# Patient Record
Sex: MALE | Race: WHITE | NOT HISPANIC OR LATINO
[De-identification: names, ages, dates, MRNs, and addresses within clinical notes are randomized per-mention and may not be internally consistent; named-entity substitution may affect disease eponyms.]

---

## 2019-03-25 ENCOUNTER — APPOINTMENT (OUTPATIENT)
Dept: OTOLARYNGOLOGY | Facility: CLINIC | Age: 74
End: 2019-03-25
Payer: MEDICARE

## 2019-03-25 VITALS
HEIGHT: 71 IN | HEART RATE: 97 BPM | DIASTOLIC BLOOD PRESSURE: 83 MMHG | WEIGHT: 150 LBS | BODY MASS INDEX: 21 KG/M2 | SYSTOLIC BLOOD PRESSURE: 139 MMHG

## 2019-03-25 PROCEDURE — 99204 OFFICE O/P NEW MOD 45 MIN: CPT | Mod: 25

## 2019-03-25 PROCEDURE — 31231 NASAL ENDOSCOPY DX: CPT

## 2019-03-25 RX ORDER — ZOLPIDEM TARTRATE 12.5 MG/1
12.5 TABLET, EXTENDED RELEASE ORAL
Refills: 0 | Status: ACTIVE | COMMUNITY

## 2019-03-25 RX ORDER — FLUTICASONE FUROATE 27.5 UG/1
27.5 SPRAY, METERED NASAL
Refills: 0 | Status: ACTIVE | COMMUNITY

## 2019-03-25 RX ORDER — AMOXICILLIN AND CLAVULANATE POTASSIUM 875; 125 MG/1; MG/1
875-125 TABLET, COATED ORAL
Qty: 20 | Refills: 0 | Status: ACTIVE | COMMUNITY
Start: 2019-03-25 | End: 1900-01-01

## 2019-03-25 RX ORDER — DIPHENHYDRAMINE HCL 25 MG/1
CAPSULE, LIQUID FILLED ORAL
Refills: 0 | Status: ACTIVE | COMMUNITY

## 2019-03-25 RX ORDER — TAMSULOSIN HYDROCHLORIDE 0.4 MG/1
0.4 CAPSULE ORAL
Refills: 0 | Status: ACTIVE | COMMUNITY

## 2019-03-25 NOTE — HISTORY OF PRESENT ILLNESS
[Sinus] : sinus surgery [No] : patient does not have a  history of radiation therapy [de-identified] : 72 y/o male w/ hx of sinus surgery (9-10 years ago), chronic PND and sinusitis who came in complaining of intermittent nasal congestion, cough and constant PND. He states the PND and coughing is worse at night. He has intermittent clear nasal discharge. He had allergy testing which came back negative. He was tested 5 years ago. He uses Claritin D and Nasacort on a daily basis. He state these medications has help subside some of his symptoms. Pt has no ear pain, ear drainage, hearing loss, tinnitus, vertigo, epistaxis,, facial pain, facial pressure, throat pain, dysphagia or fevers.\par

## 2019-03-25 NOTE — REASON FOR VISIT
[Initial Evaluation] : an initial evaluation for [Nasal Obstruction] : nasal obstruction [FreeTextEntry2] : PND and congestion

## 2019-03-25 NOTE — PHYSICAL EXAM
[Nasal Endoscopy Performed] : nasal endoscopy was performed, see procedure section for findings [] : septum deviated to the right [Midline] : trachea located in midline position [Normal] : no rashes [de-identified] : congested b/l

## 2019-03-25 NOTE — REVIEW OF SYSTEMS
[Patient Intake Form Reviewed] : Patient intake form was reviewed [Nasal Congestion] : nasal congestion [As Noted in HPI] : as noted in HPI [Negative] : Heme/Lymph [de-identified] : nasal discharge  [de-identified] : post nasal drip

## 2019-03-25 NOTE — ASSESSMENT
[FreeTextEntry1] : Patient with history of sinus problems had sinus surgery 8 years ago. Complaining of persistent postnasal drip and is dated back 10 years. He has been on Claritin-D every day. An examination he is a deviated septum in his right nasal cavity left nasal cavity shows evidence of prior surgery with some purulence which was debrided. I put him on Augmentin and told them to try to stop that D. says that over it in his best interest to stay in a day unless absolutely necessary. A followup and see us in one month at which time consideration for a CAT scan will be given.

## 2019-05-13 ENCOUNTER — APPOINTMENT (OUTPATIENT)
Dept: OTOLARYNGOLOGY | Facility: CLINIC | Age: 74
End: 2019-05-13

## 2019-05-17 ENCOUNTER — APPOINTMENT (OUTPATIENT)
Dept: OTOLARYNGOLOGY | Facility: CLINIC | Age: 74
End: 2019-05-17
Payer: MEDICARE

## 2019-05-17 VITALS
HEART RATE: 93 BPM | BODY MASS INDEX: 20.3 KG/M2 | DIASTOLIC BLOOD PRESSURE: 84 MMHG | WEIGHT: 145 LBS | HEIGHT: 71 IN | SYSTOLIC BLOOD PRESSURE: 145 MMHG

## 2019-05-17 DIAGNOSIS — J32.9 CHRONIC SINUSITIS, UNSPECIFIED: ICD-10-CM

## 2019-05-17 PROCEDURE — 99214 OFFICE O/P EST MOD 30 MIN: CPT | Mod: 25

## 2019-05-17 PROCEDURE — 31231 NASAL ENDOSCOPY DX: CPT

## 2019-05-17 RX ORDER — CIPROFLOXACIN HYDROCHLORIDE 750 MG/1
750 TABLET, FILM COATED ORAL DAILY
Qty: 10 | Refills: 0 | Status: ACTIVE | COMMUNITY
Start: 2019-05-17 | End: 1900-01-01

## 2019-05-17 NOTE — HISTORY OF PRESENT ILLNESS
[de-identified] : Patient continues to feel like he has nasal congestion and clogging in both sinuses. He has pressure that come and goes in both cheeks and in the head. He did the augmentin after the last visit but it did not help. He still cant breathe through both sides of the nose He admits to having had prior sinus surgery. He does the sinus rinses and nasal sprays with no benefit. He does admit that he has been having ear clogging as well since the nasal congestion and sinus pressure has worsened

## 2019-05-17 NOTE — ASSESSMENT
[FreeTextEntry1] : Patient follows up no significant improvement after a course of an antibiotic on examination today he still has a little bit of purulence from one another course of ciprofloxacin sent her for a CAT scan to determine if any additional intervention will be indicated. Endoscopically again we confirmed a deviated septum is right nasal cavity with some secretions in his both left ethmoid and left maxillary sinus and encouraged him to go back to doing a sinus rinses aggresivelyaggressively.

## 2019-05-21 ENCOUNTER — FORM ENCOUNTER (OUTPATIENT)
Age: 74
End: 2019-05-21

## 2019-05-22 ENCOUNTER — APPOINTMENT (OUTPATIENT)
Dept: CT IMAGING | Facility: IMAGING CENTER | Age: 74
End: 2019-05-22
Payer: MEDICARE

## 2019-05-22 ENCOUNTER — OUTPATIENT (OUTPATIENT)
Dept: OUTPATIENT SERVICES | Facility: HOSPITAL | Age: 74
LOS: 1 days | End: 2019-05-22
Payer: MEDICARE

## 2019-05-22 DIAGNOSIS — J34.2 DEVIATED NASAL SEPTUM: ICD-10-CM

## 2019-05-22 PROCEDURE — 70486 CT MAXILLOFACIAL W/O DYE: CPT

## 2019-05-22 PROCEDURE — 70486 CT MAXILLOFACIAL W/O DYE: CPT | Mod: 26

## 2019-05-23 ENCOUNTER — TRANSCRIPTION ENCOUNTER (OUTPATIENT)
Age: 74
End: 2019-05-23

## 2019-05-30 ENCOUNTER — CLINICAL ADVICE (OUTPATIENT)
Age: 74
End: 2019-05-30

## 2019-07-18 ENCOUNTER — APPOINTMENT (OUTPATIENT)
Dept: OTOLARYNGOLOGY | Facility: CLINIC | Age: 74
End: 2019-07-18
Payer: MEDICARE

## 2019-07-18 VITALS
BODY MASS INDEX: 20.3 KG/M2 | HEIGHT: 71 IN | SYSTOLIC BLOOD PRESSURE: 127 MMHG | DIASTOLIC BLOOD PRESSURE: 70 MMHG | WEIGHT: 145 LBS | HEART RATE: 62 BPM

## 2019-07-18 PROCEDURE — 31231 NASAL ENDOSCOPY DX: CPT

## 2019-07-18 PROCEDURE — 99214 OFFICE O/P EST MOD 30 MIN: CPT | Mod: 25

## 2019-07-18 NOTE — HISTORY OF PRESENT ILLNESS
[de-identified] : Patient states that he has continued to have issues with postnasal drip since the last visit. He notes that the mucus is mostly in the evening. He is clearing his throat constantly to bring up the mucus and it is a yellow mucus. He has been the flonase as well with no benefit as well as sinus rinses. He does not have any acute facial pressure or pain. he does admit that the mucus is better during the day and not as frequent

## 2019-07-18 NOTE — DATA REVIEWED
[No studies available for review at this time] : No studies available for review at this time [de-identified] : CT SINUS: DNS to the right, left maxillary and bilateral ethmoid chronci sinus inflammation

## 2019-07-18 NOTE — ASSESSMENT
[FreeTextEntry1] : Patient follows up CAT scan reviewed with him shows thickening of his maxillary sinuses as well as a severely deviated septum he was improved will continue to observe and continue with sinus rinses he understands and next that would be surgery if his postnasal drip or 2 become troublesome for him on the date of the day basis.

## 2019-09-24 ENCOUNTER — APPOINTMENT (OUTPATIENT)
Dept: OTOLARYNGOLOGY | Facility: CLINIC | Age: 74
End: 2019-09-24
Payer: MEDICARE

## 2019-09-24 VITALS
DIASTOLIC BLOOD PRESSURE: 76 MMHG | HEIGHT: 71 IN | WEIGHT: 145 LBS | HEART RATE: 63 BPM | SYSTOLIC BLOOD PRESSURE: 140 MMHG | BODY MASS INDEX: 20.3 KG/M2

## 2019-09-24 DIAGNOSIS — H65.91 UNSPECIFIED NONSUPPURATIVE OTITIS MEDIA, RIGHT EAR: ICD-10-CM

## 2019-09-24 DIAGNOSIS — J32.0 CHRONIC MAXILLARY SINUSITIS: ICD-10-CM

## 2019-09-24 DIAGNOSIS — J34.2 DEVIATED NASAL SEPTUM: ICD-10-CM

## 2019-09-24 DIAGNOSIS — R09.82 POSTNASAL DRIP: ICD-10-CM

## 2019-09-24 PROCEDURE — 99214 OFFICE O/P EST MOD 30 MIN: CPT

## 2019-09-24 PROCEDURE — 92557 COMPREHENSIVE HEARING TEST: CPT

## 2019-09-24 PROCEDURE — 92567 TYMPANOMETRY: CPT

## 2019-09-24 RX ORDER — OFLOXACIN OTIC 3 MG/ML
0.3 SOLUTION AURICULAR (OTIC) TWICE DAILY
Qty: 1 | Refills: 5 | Status: ACTIVE | COMMUNITY
Start: 2019-09-24 | End: 1900-01-01

## 2019-09-24 NOTE — HISTORY OF PRESENT ILLNESS
[de-identified] : For the last two weeks patient has been having right ear clogging. He has been using saline daily and feels like it is not helping. He is not doing any nasal sprays like flonase anymore as that doesn’t help. he has his baseline tinnitus that has not changed and has not been dizzy at all recently. He is ready to  possibly move forward with the septum procedure as he still fees like he has occasional congestion that changes sides. THe postnasal drip is still a chronic problem. He describes the right ear as feeling like he is under water

## 2019-09-24 NOTE — ASSESSMENT
[FreeTextEntry1] : Patient carotid radial happened to have several years ago had it aspirated on examination thickened tympanic membrane audiogram confirms a conductive hearing loss her right tube was placed in the right ear patient will followup with us in one month.

## 2019-09-24 NOTE — HISTORY OF PRESENT ILLNESS
[de-identified] : For the last two weeks patient has been having right ear clogging. He has been using saline daily and feels like it is not helping. He is not doing any nasal sprays like flonase anymore as that doesn’t help. he has his baseline tinnitus that has not changed and has not been dizzy at all recently. He is ready to  possibly move forward with the septum procedure as he still fees like he has occasional congestion that changes sides. THe postnasal drip is still a chronic problem. He describes the right ear as feeling like he is under water

## 2019-10-17 ENCOUNTER — APPOINTMENT (OUTPATIENT)
Dept: ORTHOPEDIC SURGERY | Facility: CLINIC | Age: 74
End: 2019-10-17
Payer: MEDICARE

## 2019-10-17 VITALS
HEIGHT: 71 IN | SYSTOLIC BLOOD PRESSURE: 140 MMHG | WEIGHT: 145 LBS | HEART RATE: 70 BPM | BODY MASS INDEX: 20.3 KG/M2 | DIASTOLIC BLOOD PRESSURE: 76 MMHG

## 2019-10-17 DIAGNOSIS — M65.311 TRIGGER THUMB, RIGHT THUMB: ICD-10-CM

## 2019-10-17 DIAGNOSIS — S56.911D STRAIN OF UNSPECIFIED MUSCLES, FASCIA AND TENDONS AT FOREARM LEVEL, RIGHT ARM, SUBSEQUENT ENCOUNTER: ICD-10-CM

## 2019-10-17 PROCEDURE — 99203 OFFICE O/P NEW LOW 30 MIN: CPT | Mod: 25

## 2019-10-17 PROCEDURE — 20550 NJX 1 TENDON SHEATH/LIGAMENT: CPT | Mod: F5

## 2019-10-22 ENCOUNTER — APPOINTMENT (OUTPATIENT)
Dept: OTOLARYNGOLOGY | Facility: CLINIC | Age: 74
End: 2019-10-22
Payer: MEDICARE

## 2019-10-22 VITALS
HEART RATE: 73 BPM | HEIGHT: 71 IN | BODY MASS INDEX: 20.3 KG/M2 | DIASTOLIC BLOOD PRESSURE: 81 MMHG | SYSTOLIC BLOOD PRESSURE: 147 MMHG | WEIGHT: 145 LBS

## 2019-10-22 PROCEDURE — 92567 TYMPANOMETRY: CPT

## 2019-10-22 PROCEDURE — 92557 COMPREHENSIVE HEARING TEST: CPT

## 2019-10-22 PROCEDURE — 99214 OFFICE O/P EST MOD 30 MIN: CPT

## 2019-10-22 RX ORDER — ACETAMINOPHEN 325 MG/1
TABLET, FILM COATED ORAL
Refills: 0 | Status: ACTIVE | COMMUNITY

## 2019-10-22 NOTE — HISTORY OF PRESENT ILLNESS
[de-identified] : PAtient still has the occasional and intermittent sensation of right ear muffling. He admits that he no longer feels under water but eh has his heartbeat in his hear a few times a week that can last several hours. His nasal congestion and sinus pressure comes and goes and he thinks it is related to histamines so he has tried to stay away from certain foods.  THe heartbeat sensation comes and goes in  both ears recently

## 2019-10-22 NOTE — ASSESSMENT
[FreeTextEntry1] : Patient doing well tube was in his right ear in appropriate position tube was in hearing is dramatically improved back to his baseline he is starting to develop some intermittent pulsatile tenderness we will continue to monitor this is a  process 3 months consideration for full MRI MRA will workup will be considered.

## 2019-10-26 ENCOUNTER — EMERGENCY (EMERGENCY)
Facility: HOSPITAL | Age: 74
LOS: 1 days | Discharge: HOME | End: 2019-10-26
Attending: EMERGENCY MEDICINE
Payer: COMMERCIAL

## 2019-10-26 VITALS
SYSTOLIC BLOOD PRESSURE: 159 MMHG | WEIGHT: 160.06 LBS | HEART RATE: 70 BPM | DIASTOLIC BLOOD PRESSURE: 91 MMHG | RESPIRATION RATE: 18 BRPM | OXYGEN SATURATION: 98 %

## 2019-10-26 VITALS
SYSTOLIC BLOOD PRESSURE: 162 MMHG | HEART RATE: 72 BPM | TEMPERATURE: 98 F | OXYGEN SATURATION: 98 % | RESPIRATION RATE: 18 BRPM | DIASTOLIC BLOOD PRESSURE: 72 MMHG

## 2019-10-26 LAB
ALBUMIN SERPL ELPH-MCNC: 4.1 G/DL — SIGNIFICANT CHANGE UP (ref 3.3–5)
ALP SERPL-CCNC: 80 U/L — SIGNIFICANT CHANGE UP (ref 40–120)
ALT FLD-CCNC: 27 U/L — SIGNIFICANT CHANGE UP (ref 10–45)
ANION GAP SERPL CALC-SCNC: 12 MMOL/L — SIGNIFICANT CHANGE UP (ref 5–17)
APPEARANCE UR: CLEAR — SIGNIFICANT CHANGE UP
APTT BLD: 34.6 SEC — SIGNIFICANT CHANGE UP (ref 27.5–36.3)
AST SERPL-CCNC: 36 U/L — SIGNIFICANT CHANGE UP (ref 10–40)
BASOPHILS # BLD AUTO: 0.02 K/UL — SIGNIFICANT CHANGE UP (ref 0–0.2)
BASOPHILS NFR BLD AUTO: 0.3 % — SIGNIFICANT CHANGE UP (ref 0–2)
BILIRUB SERPL-MCNC: 0.3 MG/DL — SIGNIFICANT CHANGE UP (ref 0.2–1.2)
BILIRUB UR-MCNC: NEGATIVE — SIGNIFICANT CHANGE UP
BUN SERPL-MCNC: 35 MG/DL — HIGH (ref 7–23)
CALCIUM SERPL-MCNC: 9.5 MG/DL — SIGNIFICANT CHANGE UP (ref 8.4–10.5)
CHLORIDE SERPL-SCNC: 104 MMOL/L — SIGNIFICANT CHANGE UP (ref 96–108)
CO2 SERPL-SCNC: 25 MMOL/L — SIGNIFICANT CHANGE UP (ref 22–31)
COLOR SPEC: YELLOW — SIGNIFICANT CHANGE UP
CREAT SERPL-MCNC: 0.83 MG/DL — SIGNIFICANT CHANGE UP (ref 0.5–1.3)
DIFF PNL FLD: NEGATIVE — SIGNIFICANT CHANGE UP
EOSINOPHIL # BLD AUTO: 0.03 K/UL — SIGNIFICANT CHANGE UP (ref 0–0.5)
EOSINOPHIL NFR BLD AUTO: 0.5 % — SIGNIFICANT CHANGE UP (ref 0–6)
ETHANOL SERPL-MCNC: SIGNIFICANT CHANGE UP MG/DL (ref 0–10)
GLUCOSE SERPL-MCNC: 113 MG/DL — HIGH (ref 70–99)
GLUCOSE UR QL: NEGATIVE — SIGNIFICANT CHANGE UP
HCT VFR BLD CALC: 41.8 % — SIGNIFICANT CHANGE UP (ref 39–50)
HGB BLD-MCNC: 13.6 G/DL — SIGNIFICANT CHANGE UP (ref 13–17)
IMM GRANULOCYTES NFR BLD AUTO: 0.2 % — SIGNIFICANT CHANGE UP (ref 0–1.5)
INR BLD: 1.02 RATIO — SIGNIFICANT CHANGE UP (ref 0.88–1.16)
KETONES UR-MCNC: NEGATIVE — SIGNIFICANT CHANGE UP
LACTATE BLDV-MCNC: 1.4 MMOL/L — SIGNIFICANT CHANGE UP (ref 0.7–2)
LEUKOCYTE ESTERASE UR-ACNC: NEGATIVE — SIGNIFICANT CHANGE UP
LIDOCAIN IGE QN: 43 U/L — SIGNIFICANT CHANGE UP (ref 7–60)
LYMPHOCYTES # BLD AUTO: 1.03 K/UL — SIGNIFICANT CHANGE UP (ref 1–3.3)
LYMPHOCYTES # BLD AUTO: 16.2 % — SIGNIFICANT CHANGE UP (ref 13–44)
MCHC RBC-ENTMCNC: 30.6 PG — SIGNIFICANT CHANGE UP (ref 27–34)
MCHC RBC-ENTMCNC: 32.5 GM/DL — SIGNIFICANT CHANGE UP (ref 32–36)
MCV RBC AUTO: 94.1 FL — SIGNIFICANT CHANGE UP (ref 80–100)
MONOCYTES # BLD AUTO: 0.67 K/UL — SIGNIFICANT CHANGE UP (ref 0–0.9)
MONOCYTES NFR BLD AUTO: 10.6 % — SIGNIFICANT CHANGE UP (ref 2–14)
NEUTROPHILS # BLD AUTO: 4.59 K/UL — SIGNIFICANT CHANGE UP (ref 1.8–7.4)
NEUTROPHILS NFR BLD AUTO: 72.2 % — SIGNIFICANT CHANGE UP (ref 43–77)
NITRITE UR-MCNC: NEGATIVE — SIGNIFICANT CHANGE UP
NRBC # BLD: 0 /100 WBCS — SIGNIFICANT CHANGE UP (ref 0–0)
PH UR: 7 — SIGNIFICANT CHANGE UP (ref 5–8)
PLATELET # BLD AUTO: 198 K/UL — SIGNIFICANT CHANGE UP (ref 150–400)
POTASSIUM SERPL-MCNC: 4.9 MMOL/L — SIGNIFICANT CHANGE UP (ref 3.5–5.3)
POTASSIUM SERPL-SCNC: 4.9 MMOL/L — SIGNIFICANT CHANGE UP (ref 3.5–5.3)
PROT SERPL-MCNC: 7.5 G/DL — SIGNIFICANT CHANGE UP (ref 6–8.3)
PROT UR-MCNC: SIGNIFICANT CHANGE UP
PROTHROM AB SERPL-ACNC: 11.7 SEC — SIGNIFICANT CHANGE UP (ref 10–12.9)
RBC # BLD: 4.44 M/UL — SIGNIFICANT CHANGE UP (ref 4.2–5.8)
RBC # FLD: 13.3 % — SIGNIFICANT CHANGE UP (ref 10.3–14.5)
SODIUM SERPL-SCNC: 141 MMOL/L — SIGNIFICANT CHANGE UP (ref 135–145)
SP GR SPEC: 1.02 — SIGNIFICANT CHANGE UP (ref 1.01–1.02)
UROBILINOGEN FLD QL: NEGATIVE — SIGNIFICANT CHANGE UP
WBC # BLD: 6.35 K/UL — SIGNIFICANT CHANGE UP (ref 3.8–10.5)
WBC # FLD AUTO: 6.35 K/UL — SIGNIFICANT CHANGE UP (ref 3.8–10.5)

## 2019-10-26 PROCEDURE — 72170 X-RAY EXAM OF PELVIS: CPT | Mod: 26

## 2019-10-26 PROCEDURE — 83605 ASSAY OF LACTIC ACID: CPT

## 2019-10-26 PROCEDURE — 85730 THROMBOPLASTIN TIME PARTIAL: CPT

## 2019-10-26 PROCEDURE — 12001 RPR S/N/AX/GEN/TRNK 2.5CM/<: CPT

## 2019-10-26 PROCEDURE — 83690 ASSAY OF LIPASE: CPT

## 2019-10-26 PROCEDURE — 71045 X-RAY EXAM CHEST 1 VIEW: CPT | Mod: 26

## 2019-10-26 PROCEDURE — 73562 X-RAY EXAM OF KNEE 3: CPT

## 2019-10-26 PROCEDURE — 99241 OFFICE CONSULTATION NEW/ESTAB PATIENT 15 MIN: CPT

## 2019-10-26 PROCEDURE — 72170 X-RAY EXAM OF PELVIS: CPT

## 2019-10-26 PROCEDURE — 85027 COMPLETE CBC AUTOMATED: CPT

## 2019-10-26 PROCEDURE — 73060 X-RAY EXAM OF HUMERUS: CPT | Mod: 26,LT

## 2019-10-26 PROCEDURE — 73030 X-RAY EXAM OF SHOULDER: CPT

## 2019-10-26 PROCEDURE — 71045 X-RAY EXAM CHEST 1 VIEW: CPT

## 2019-10-26 PROCEDURE — 85610 PROTHROMBIN TIME: CPT

## 2019-10-26 PROCEDURE — 99291 CRITICAL CARE FIRST HOUR: CPT | Mod: 25

## 2019-10-26 PROCEDURE — 73562 X-RAY EXAM OF KNEE 3: CPT | Mod: 26,RT

## 2019-10-26 PROCEDURE — G0390: CPT

## 2019-10-26 PROCEDURE — 81003 URINALYSIS AUTO W/O SCOPE: CPT

## 2019-10-26 PROCEDURE — 73090 X-RAY EXAM OF FOREARM: CPT

## 2019-10-26 PROCEDURE — 73060 X-RAY EXAM OF HUMERUS: CPT

## 2019-10-26 PROCEDURE — 73030 X-RAY EXAM OF SHOULDER: CPT | Mod: 26,LT

## 2019-10-26 PROCEDURE — 73090 X-RAY EXAM OF FOREARM: CPT | Mod: 26,LT

## 2019-10-26 PROCEDURE — 73080 X-RAY EXAM OF ELBOW: CPT

## 2019-10-26 PROCEDURE — 96375 TX/PRO/DX INJ NEW DRUG ADDON: CPT | Mod: XU

## 2019-10-26 PROCEDURE — 80307 DRUG TEST PRSMV CHEM ANLYZR: CPT

## 2019-10-26 PROCEDURE — 96374 THER/PROPH/DIAG INJ IV PUSH: CPT | Mod: XU

## 2019-10-26 PROCEDURE — 80053 COMPREHEN METABOLIC PANEL: CPT

## 2019-10-26 PROCEDURE — 73080 X-RAY EXAM OF ELBOW: CPT | Mod: 26,LT

## 2019-10-26 RX ORDER — MORPHINE SULFATE 50 MG/1
4 CAPSULE, EXTENDED RELEASE ORAL ONCE
Refills: 0 | Status: DISCONTINUED | OUTPATIENT
Start: 2019-10-26 | End: 2019-10-26

## 2019-10-26 RX ORDER — SODIUM CHLORIDE 9 MG/ML
1000 INJECTION INTRAMUSCULAR; INTRAVENOUS; SUBCUTANEOUS ONCE
Refills: 0 | Status: COMPLETED | OUTPATIENT
Start: 2019-10-26 | End: 2019-10-26

## 2019-10-26 RX ORDER — CEFAZOLIN SODIUM 1 G
VIAL (EA) INJECTION
Refills: 0 | Status: DISCONTINUED | OUTPATIENT
Start: 2019-10-26 | End: 2019-10-26

## 2019-10-26 RX ORDER — OXYCODONE HYDROCHLORIDE 5 MG/1
1 TABLET ORAL
Qty: 3 | Refills: 0
Start: 2019-10-26 | End: 2019-10-26

## 2019-10-26 RX ORDER — CEFAZOLIN SODIUM 1 G
1000 VIAL (EA) INJECTION ONCE
Refills: 0 | Status: COMPLETED | OUTPATIENT
Start: 2019-10-26 | End: 2019-10-26

## 2019-10-26 RX ADMIN — SODIUM CHLORIDE 1000 MILLILITER(S): 9 INJECTION INTRAMUSCULAR; INTRAVENOUS; SUBCUTANEOUS at 13:20

## 2019-10-26 RX ADMIN — MORPHINE SULFATE 4 MILLIGRAM(S): 50 CAPSULE, EXTENDED RELEASE ORAL at 13:30

## 2019-10-26 RX ADMIN — Medication 100 MILLIGRAM(S): at 13:31

## 2019-10-26 RX ADMIN — MORPHINE SULFATE 4 MILLIGRAM(S): 50 CAPSULE, EXTENDED RELEASE ORAL at 14:21
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
## 2019-10-26 NOTE — ED PROVIDER NOTE - CRITICAL CARE PROVIDED
consultation with other physicians/documentation/direct patient care (not related to procedure)/interpretation of diagnostic studies

## 2019-10-26 NOTE — ED ADULT NURSE NOTE - NSIMPLEMENTINTERV_GEN_ALL_ED
Implemented All Universal Safety Interventions:  [place] to call system. Call bell, personal items and telephone within reach. Instruct patient to call for assistance. Room bathroom lighting operational. Non-slip footwear when patient is off stretcher. Physically safe environment: no spills, clutter or unnecessary equipment. Stretcher in lowest position, wheels locked, appropriate side rails in place.

## 2019-10-26 NOTE — ED PROVIDER NOTE - PROGRESS NOTE DETAILS
Adelso Pgy3: Patient feels improved. Well appearing and VSS. Pt will be d/c'd with strict return precautions and close f/u with md. Pt verbalized understanding and states they want to leave, ready for d/c.

## 2019-10-26 NOTE — ED PROVIDER NOTE - PMH
Acute sinusitis    Anxiety    Hypertension    Sleep Apnea  according to pt. - has not taken sleep studies

## 2019-10-26 NOTE — ED PROVIDER NOTE - PSH
Deviated nasal septum  repaired March 2012  Hernia, Inguinal  RIH  History of Appendectomy    History of Cataract Surgery  right eye  History of Laminectomy  x2

## 2019-11-25 ENCOUNTER — APPOINTMENT (OUTPATIENT)
Dept: PHARMACY | Facility: CLINIC | Age: 74
End: 2019-11-25
Payer: SELF-PAY

## 2019-11-25 PROBLEM — J01.90 ACUTE SINUSITIS, UNSPECIFIED: Chronic | Status: ACTIVE | Noted: 2019-10-26

## 2019-11-25 PROCEDURE — V5010 ASSESSMENT FOR HEARING AID: CPT

## 2019-11-26 ENCOUNTER — TRANSCRIPTION ENCOUNTER (OUTPATIENT)
Age: 74
End: 2019-11-26

## 2019-12-03 ENCOUNTER — CLINICAL ADVICE (OUTPATIENT)
Age: 74
End: 2019-12-03

## 2019-12-23 ENCOUNTER — APPOINTMENT (OUTPATIENT)
Dept: PHARMACY | Facility: CLINIC | Age: 74
End: 2019-12-23
Payer: SELF-PAY

## 2019-12-23 PROCEDURE — V5261B: CUSTOM

## 2020-01-06 ENCOUNTER — APPOINTMENT (OUTPATIENT)
Dept: PHARMACY | Facility: CLINIC | Age: 75
End: 2020-01-06
Payer: SELF-PAY

## 2020-01-06 PROCEDURE — V5299A: CUSTOM | Mod: NC

## 2020-02-17 ENCOUNTER — APPOINTMENT (OUTPATIENT)
Dept: OTOLARYNGOLOGY | Facility: CLINIC | Age: 75
End: 2020-02-17
Payer: MEDICARE

## 2020-02-17 VITALS
DIASTOLIC BLOOD PRESSURE: 89 MMHG | HEIGHT: 71 IN | WEIGHT: 150 LBS | BODY MASS INDEX: 21 KG/M2 | SYSTOLIC BLOOD PRESSURE: 149 MMHG | HEART RATE: 71 BPM

## 2020-02-17 DIAGNOSIS — R04.0 EPISTAXIS: ICD-10-CM

## 2020-02-17 DIAGNOSIS — H90.3 SENSORINEURAL HEARING LOSS, BILATERAL: ICD-10-CM

## 2020-02-17 PROCEDURE — 99214 OFFICE O/P EST MOD 30 MIN: CPT | Mod: 25

## 2020-02-17 PROCEDURE — 31231 NASAL ENDOSCOPY DX: CPT

## 2020-02-17 NOTE — PHYSICAL EXAM
[Midline] : trachea located in midline position [Normal] : no rashes [de-identified] : tube intact in the right TM

## 2020-02-17 NOTE — HISTORY OF PRESENT ILLNESS
[de-identified] : PAtient is here today with no changes in hearing and is using his hearing aids with benefit. He does not have any issues with ringing in the ears or pain in the ears. He does not have any issues with dizziness. He is overall doing well but felt the need for a checkup. He uses his nasal rinses daily with benefit without nasal congestion or runny nose. He thinks that the tube is still in the right ear drum. He does state that on occasion he does the nasal rinses and blood will come out with the rinses. He does not have any mary nosebleeds

## 2020-02-17 NOTE — ASSESSMENT
[FreeTextEntry1] : Patient doing well tube is still in place and the right ear and blowing some bloody discharge of his left nostril endoscopically no tumors masses or polyps patient was reassured will continue a sinus rinses were followed with us in 3 months

## 2020-02-19 ENCOUNTER — APPOINTMENT (OUTPATIENT)
Dept: PHARMACY | Facility: CLINIC | Age: 75
End: 2020-02-19
Payer: SELF-PAY

## 2020-02-19 PROCEDURE — V5299A: CUSTOM | Mod: NC

## 2020-05-18 ENCOUNTER — APPOINTMENT (OUTPATIENT)
Dept: PHARMACY | Facility: CLINIC | Age: 75
End: 2020-05-18
Payer: SELF-PAY

## 2020-05-18 PROCEDURE — V5299A: CUSTOM | Mod: NC

## 2020-05-29 ENCOUNTER — APPOINTMENT (OUTPATIENT)
Dept: OTOLARYNGOLOGY | Facility: CLINIC | Age: 75
End: 2020-05-29
Payer: MEDICARE

## 2020-05-29 VITALS
BODY MASS INDEX: 21 KG/M2 | SYSTOLIC BLOOD PRESSURE: 145 MMHG | HEIGHT: 71 IN | HEART RATE: 70 BPM | WEIGHT: 150 LBS | TEMPERATURE: 98 F | DIASTOLIC BLOOD PRESSURE: 80 MMHG

## 2020-05-29 DIAGNOSIS — H69.81 OTHER SPECIFIED DISORDERS OF EUSTACHIAN TUBE, RIGHT EAR: ICD-10-CM

## 2020-05-29 DIAGNOSIS — T16.1XXA FOREIGN BODY IN RIGHT EAR, INITIAL ENCOUNTER: ICD-10-CM

## 2020-05-29 PROCEDURE — 99214 OFFICE O/P EST MOD 30 MIN: CPT | Mod: 25

## 2020-05-29 PROCEDURE — 69200 CLEAR OUTER EAR CANAL: CPT | Mod: RT

## 2020-05-29 NOTE — HISTORY OF PRESENT ILLNESS
[de-identified] : PAtient has nto had any pain in the ears or pressure and denies having any drainage from the eras. He does use nasal rinses and admits  that he feels water in the ears as a result. He has mucus that he blows out occasionally and also tends to swallow mucus. Usually it is yellow in color.

## 2020-05-29 NOTE — ASSESSMENT
[FreeTextEntry1] : Patient follows up had a tube placed in his right ear about a year ago he is here for examination tube is out but it is horizontal was brought to the back under the microscope and removed no problems tympanic membrane is healed no further acute interventions indicated he will follow-up with us as needed.

## 2020-05-29 NOTE — PHYSICAL EXAM
[Midline] : trachea located in midline position [Normal] : no neck adenopathy [de-identified] : tube out of TM, sitting in ear canal

## 2020-06-08 ENCOUNTER — APPOINTMENT (OUTPATIENT)
Dept: PHARMACY | Facility: CLINIC | Age: 75
End: 2020-06-08
Payer: SELF-PAY

## 2020-06-08 PROCEDURE — V5299A: CUSTOM | Mod: NC

## 2020-08-19 ENCOUNTER — APPOINTMENT (OUTPATIENT)
Dept: PHARMACY | Facility: CLINIC | Age: 75
End: 2020-08-19

## 2023-12-13 NOTE — ED PROVIDER NOTE - PATIENT PORTAL LINK FT
Pt states understanding of dc instructions and f/u care. Pt able to amb out w/ steady gait.   You can access the FollowMyHealth Patient Portal offered by [facility] by registering at the following website: [email or web address]. By joining [employer]’s FollowMyHealth portal, you will also be able to view your health information using other applications (apps) compatible with our system.